# Patient Record
Sex: MALE | Race: WHITE | Employment: OTHER | URBAN - METROPOLITAN AREA
[De-identification: names, ages, dates, MRNs, and addresses within clinical notes are randomized per-mention and may not be internally consistent; named-entity substitution may affect disease eponyms.]

---

## 2017-06-08 ENCOUNTER — LAB REQUISITION (OUTPATIENT)
Dept: LAB | Facility: HOSPITAL | Age: 77
End: 2017-06-08
Payer: MEDICAID

## 2017-06-08 DIAGNOSIS — H11.001 PTERYGIUM OF RIGHT EYE: ICD-10-CM

## 2017-06-08 PROCEDURE — 88304 TISSUE EXAM BY PATHOLOGIST: CPT | Performed by: OPHTHALMOLOGY

## 2017-10-19 RX ORDER — ALLOPURINOL 100 MG/1
100 TABLET ORAL DAILY
COMMUNITY

## 2017-10-19 RX ORDER — AMLODIPINE BESYLATE 10 MG/1
10 TABLET ORAL DAILY
COMMUNITY

## 2017-10-19 RX ORDER — METOPROLOL SUCCINATE 25 MG/1
25 TABLET, EXTENDED RELEASE ORAL DAILY
COMMUNITY

## 2017-11-09 ENCOUNTER — SURGERY (OUTPATIENT)
Age: 77
End: 2017-11-09

## 2017-11-09 ENCOUNTER — HOSPITAL ENCOUNTER (OUTPATIENT)
Facility: HOSPITAL | Age: 77
Setting detail: HOSPITAL OUTPATIENT SURGERY
Discharge: HOME | End: 2017-11-09
Attending: OPHTHALMOLOGY | Admitting: OPHTHALMOLOGY
Payer: MEDICAID

## 2017-11-09 VITALS
HEIGHT: 64 IN | TEMPERATURE: 99 F | WEIGHT: 172.63 LBS | HEART RATE: 52 BPM | SYSTOLIC BLOOD PRESSURE: 115 MMHG | OXYGEN SATURATION: 96 % | BODY MASS INDEX: 29.47 KG/M2 | DIASTOLIC BLOOD PRESSURE: 77 MMHG | RESPIRATION RATE: 18 BRPM

## 2017-11-09 PROCEDURE — 08RJ3JZ REPLACEMENT OF RIGHT LENS WITH SYNTHETIC SUBSTITUTE, PERCUTANEOUS APPROACH: ICD-10-PCS | Performed by: OPHTHALMOLOGY

## 2017-11-09 RX ORDER — KETOROLAC TROMETHAMINE 5 MG/ML
1 SOLUTION OPHTHALMIC SEE ADMIN INSTRUCTIONS
Status: COMPLETED | OUTPATIENT
Start: 2017-11-09 | End: 2017-11-09

## 2017-11-09 RX ORDER — BALANCED SALT SOLUTION 6.4; .75; .48; .3; 3.9; 1.7 MG/ML; MG/ML; MG/ML; MG/ML; MG/ML; MG/ML
SOLUTION OPHTHALMIC AS NEEDED
Status: DISCONTINUED | OUTPATIENT
Start: 2017-11-09 | End: 2017-11-09 | Stop reason: HOSPADM

## 2017-11-09 RX ORDER — CIPROFLOXACIN HYDROCHLORIDE 3.5 MG/ML
SOLUTION/ DROPS TOPICAL AS NEEDED
Status: DISCONTINUED | OUTPATIENT
Start: 2017-11-09 | End: 2017-11-09 | Stop reason: HOSPADM

## 2017-11-09 RX ORDER — PHENYLEPHRINE HCL 2.5 %
1 DROPS OPHTHALMIC (EYE) SEE ADMIN INSTRUCTIONS
Status: COMPLETED | OUTPATIENT
Start: 2017-11-09 | End: 2017-11-09

## 2017-11-09 RX ORDER — LIDOCAINE HYDROCHLORIDE 10 MG/ML
INJECTION, SOLUTION EPIDURAL; INFILTRATION; INTRACAUDAL; PERINEURAL AS NEEDED
Status: DISCONTINUED | OUTPATIENT
Start: 2017-11-09 | End: 2017-11-09 | Stop reason: HOSPADM

## 2017-11-09 RX ORDER — DIAZEPAM 5 MG/1
10 TABLET ORAL ONCE
Status: COMPLETED | OUTPATIENT
Start: 2017-11-09 | End: 2017-11-09

## 2017-11-09 RX ORDER — TROPICAMIDE 10 MG/ML
1 SOLUTION/ DROPS OPHTHALMIC SEE ADMIN INSTRUCTIONS
Status: COMPLETED | OUTPATIENT
Start: 2017-11-09 | End: 2017-11-09

## 2017-11-09 RX ORDER — TETRACAINE HYDROCHLORIDE 5 MG/ML
1 SOLUTION OPHTHALMIC SEE ADMIN INSTRUCTIONS
Status: DISCONTINUED | OUTPATIENT
Start: 2017-11-09 | End: 2017-11-09 | Stop reason: HOSPADM

## 2017-11-09 RX ORDER — TETRACAINE HYDROCHLORIDE 5 MG/ML
SOLUTION OPHTHALMIC AS NEEDED
Status: DISCONTINUED | OUTPATIENT
Start: 2017-11-09 | End: 2017-11-09 | Stop reason: HOSPADM

## 2017-11-09 RX ORDER — CYCLOPENTOLATE HYDROCHLORIDE 10 MG/ML
1 SOLUTION/ DROPS OPHTHALMIC SEE ADMIN INSTRUCTIONS
Status: COMPLETED | OUTPATIENT
Start: 2017-11-09 | End: 2017-11-09

## 2017-11-09 NOTE — OPERATIVE REPORT
[facility]  Op Note    Milo Case Location: OR   [facility] [id number] MRN [id number]   Admission Date 11/9/2017 Operation Date 11/9/2017   Attending Physician Herrera Coronado MD Operating Physician Sushma Baum MD     Pre-Operative Diagnosis +21.5DSE 1.50DCYL axis 36 degrees was then injected into the capsular bag and rotated into place. The viscoelastic was then removed using the I/A handpiece. The anterior chamber was inflated and the wound was hydrated.   The wound was checked for a watert

## 2018-01-20 ENCOUNTER — OFFICE VISIT (OUTPATIENT)
Dept: FAMILY MEDICINE CLINIC | Facility: CLINIC | Age: 78
End: 2018-01-20

## 2018-01-20 VITALS
HEART RATE: 64 BPM | BODY MASS INDEX: 30 KG/M2 | TEMPERATURE: 100 F | SYSTOLIC BLOOD PRESSURE: 118 MMHG | DIASTOLIC BLOOD PRESSURE: 60 MMHG | WEIGHT: 173 LBS | OXYGEN SATURATION: 98 % | RESPIRATION RATE: 18 BRPM

## 2018-01-20 DIAGNOSIS — E86.0 DEHYDRATION: ICD-10-CM

## 2018-01-20 DIAGNOSIS — R11.10 VOMITING, INTRACTABILITY OF VOMITING NOT SPECIFIED, PRESENCE OF NAUSEA NOT SPECIFIED, UNSPECIFIED VOMITING TYPE: ICD-10-CM

## 2018-01-20 DIAGNOSIS — R00.1 BRADYCARDIA: Primary | ICD-10-CM

## 2018-01-20 DIAGNOSIS — I49.9 IRREGULAR HEART RHYTHM: ICD-10-CM

## 2018-01-20 PROCEDURE — 99203 OFFICE O/P NEW LOW 30 MIN: CPT | Performed by: PHYSICIAN ASSISTANT

## 2018-01-20 NOTE — PROGRESS NOTES
CHIEF COMPLAINT:     Patient presents with:  Flu: pt c\o of poss flu , cant keep down fluids, x2day       HPI:   Leti Davidson is a 78year old male who presents with complaints of vomiting, dizziness, weakness, nasal congestion, cough, and decreased po GENERAL: well developed, well nourished, fatigued, cooperative, resting tremor   SKIN: no rashes, nosuspicious lesions, no abnormal pigmentation  HEAD: atraumatic, normocephalic  EYES: EOM intact, PERRL. Conjunctiva normal.  Cornea clear.   Lid margins n

## (undated) DEVICE — CATARACT PATIENT CARE KIT

## (undated) DEVICE — ACTIVE FMS W/ INTREPID* ULTRA SLEEVES, 0.9MM 30° ABS* INTREPID* BALANCED TIP: Brand: ALCON

## (undated) DEVICE — SINGLE USE MEDICAL DEVICE FOR OPHTHALMIC SURGERY: Brand: SIL. COATED I/A 45 MIL 12/B

## (undated) DEVICE — PREP BETADINE SOL 5% EYE

## (undated) DEVICE — 3M(TM) TRANSPORE SURGICAL TAPE 1527-1: Brand: 3M™ TRANSPORE™

## (undated) DEVICE — EYE PACK: Brand: MEDLINE INDUSTRIES, INC.

## (undated) DEVICE — CLEARCUT® SIDEPORT KNIFE DUAL BEVEL 1.0MM ANGLED: Brand: CLEARCUT®

## (undated) DEVICE — SOL H2O 1000ML BTL

## (undated) DEVICE — NEEDLE CYSTOTOME W/25G CANNULA

## (undated) DEVICE — BLADE ADVANCUT CORNEAL 2.4MM

## (undated) DEVICE — BSS BAG CENTURION

## (undated) DEVICE — UNFOLDER PLATINUM 1 SERIES CRTRDG 30/BOX: Brand: UNFOLDER PLATINUM 1 SERIES

## (undated) DEVICE — STERILE POLYISOPRENE POWDER-FREE SURGICAL GLOVES: Brand: PROTEXIS

## (undated) NOTE — LETTER
DECLINATION FORM    [address]/[facility] provides interpreters for patients who are deaf, hearing impaired, or have Limited [place] Proficiency in order to ensure thee patients an equal opportunity to benefit from services.  There Patient Name: Juani Winchester     : 1940    Page 1 of 1     Printed: @DATE      Medical Record #: [id number]   Revised (03)